# Patient Record
Sex: FEMALE | Race: BLACK OR AFRICAN AMERICAN | URBAN - METROPOLITAN AREA
[De-identification: names, ages, dates, MRNs, and addresses within clinical notes are randomized per-mention and may not be internally consistent; named-entity substitution may affect disease eponyms.]

---

## 2019-12-05 ENCOUNTER — IMPORTED ENCOUNTER (OUTPATIENT)
Dept: URBAN - METROPOLITAN AREA CLINIC 9 | Facility: CLINIC | Age: 59
End: 2019-12-05

## 2019-12-06 NOTE — PATIENT DISCUSSION
GLAUCOMA SUSPECT, OU : INCREASED CUP/DISC RATIO. NO FAMILY HISTORY. NORMAL VF AND RNFL CHANGES DISCUSSED WITH PATIENT.

## 2020-06-26 ENCOUNTER — IMPORTED ENCOUNTER (OUTPATIENT)
Dept: URBAN - METROPOLITAN AREA CLINIC 9 | Facility: CLINIC | Age: 60
End: 2020-06-26

## 2020-07-28 ENCOUNTER — IMPORTED ENCOUNTER (OUTPATIENT)
Dept: URBAN - METROPOLITAN AREA CLINIC 9 | Facility: CLINIC | Age: 60
End: 2020-07-28

## 2020-09-03 ENCOUNTER — IMPORTED ENCOUNTER (OUTPATIENT)
Dept: URBAN - METROPOLITAN AREA CLINIC 9 | Facility: CLINIC | Age: 60
End: 2020-09-03

## 2020-09-09 ENCOUNTER — IMPORTED ENCOUNTER (OUTPATIENT)
Dept: URBAN - METROPOLITAN AREA CLINIC 9 | Facility: CLINIC | Age: 60
End: 2020-09-09

## 2020-12-31 ENCOUNTER — IMPORTED ENCOUNTER (OUTPATIENT)
Dept: URBAN - METROPOLITAN AREA CLINIC 9 | Facility: CLINIC | Age: 60
End: 2020-12-31

## 2021-01-13 ENCOUNTER — IMPORTED ENCOUNTER (OUTPATIENT)
Dept: URBAN - METROPOLITAN AREA CLINIC 9 | Facility: CLINIC | Age: 61
End: 2021-01-13

## 2021-03-03 ENCOUNTER — IMPORTED ENCOUNTER (OUTPATIENT)
Dept: URBAN - METROPOLITAN AREA CLINIC 9 | Facility: CLINIC | Age: 61
End: 2021-03-03

## 2021-04-14 ENCOUNTER — IMPORTED ENCOUNTER (OUTPATIENT)
Dept: URBAN - METROPOLITAN AREA CLINIC 9 | Facility: CLINIC | Age: 61
End: 2021-04-14

## 2021-05-26 ENCOUNTER — IMPORTED ENCOUNTER (OUTPATIENT)
Dept: URBAN - METROPOLITAN AREA CLINIC 9 | Facility: CLINIC | Age: 61
End: 2021-05-26

## 2021-06-16 ENCOUNTER — IMPORTED ENCOUNTER (OUTPATIENT)
Dept: URBAN - METROPOLITAN AREA CLINIC 9 | Facility: CLINIC | Age: 61
End: 2021-06-16

## 2021-06-21 ENCOUNTER — IMPORTED ENCOUNTER (OUTPATIENT)
Dept: URBAN - METROPOLITAN AREA CLINIC 9 | Facility: CLINIC | Age: 61
End: 2021-06-21

## 2021-07-02 ENCOUNTER — IMPORTED ENCOUNTER (OUTPATIENT)
Dept: URBAN - METROPOLITAN AREA CLINIC 9 | Facility: CLINIC | Age: 61
End: 2021-07-02

## 2021-07-08 ENCOUNTER — IMPORTED ENCOUNTER (OUTPATIENT)
Dept: URBAN - METROPOLITAN AREA CLINIC 9 | Facility: CLINIC | Age: 61
End: 2021-07-08

## 2021-10-16 ASSESSMENT — TONOMETRY
OD_IOP_MMHG: 10
OS_IOP_MMHG: 14
OD_IOP_MMHG: 16
OS_IOP_MMHG: 11
OD_IOP_MMHG: 11
OD_IOP_MMHG: 12
OS_IOP_MMHG: 15
OS_IOP_MMHG: 14
OD_IOP_MMHG: 15
OS_IOP_MMHG: 6
OD_IOP_MMHG: 18
OD_IOP_MMHG: 12
OD_IOP_MMHG: 18
OD_IOP_MMHG: 10
OS_IOP_MMHG: 11
OS_IOP_MMHG: 16
OS_IOP_MMHG: 11
OS_IOP_MMHG: 15
OD_IOP_MMHG: 12
OD_IOP_MMHG: 14
OS_IOP_MMHG: 14
OS_IOP_MMHG: 14
OD_IOP_MMHG: 11
OS_IOP_MMHG: 11
OS_IOP_MMHG: 8

## 2021-10-16 ASSESSMENT — KERATOMETRY
OS_K1POWER_DIOPTERS: 43
OS_AXISANGLE2_DEGREES: 112
OD_AXISANGLE2_DEGREES: 19
OS_K1POWER_DIOPTERS: 42.75
OS_AXISANGLE_DEGREES: 22
OS_AXISANGLE_DEGREES: 17
OD_K1POWER_DIOPTERS: 43
OD_AXISANGLE_DEGREES: 101
OD_K2POWER_DIOPTERS: 43.5
OD_AXISANGLE2_DEGREES: 11
OS_K2POWER_DIOPTERS: 43.75
OD_K2POWER_DIOPTERS: 43.25
OD_AXISANGLE_DEGREES: 109
OS_K2POWER_DIOPTERS: 44
OD_K1POWER_DIOPTERS: 43
OS_AXISANGLE2_DEGREES: 107

## 2021-10-16 ASSESSMENT — VISUAL ACUITY
OD_CC: 20/20 -2 SN
OD_CC: 20/30 SN
OS_SC: 20/400 SN
OS_CC: 20/25 SN
OS_CC: 20/30 + SN
OD_CC: 20/30 + SN
OS_CC: CF 2FT SN
OS_CC: 20/30 -2 SN
OD_CC: 20/30 + SN
OD_CC: 20/25 - SN
OS_CC: CF 6' LV
OD_CC: 20/20 -2 SN
OS_CC: 20/25 SN
OD_CC: 20/30 SN
OS_CC: 20/30 - SN
OD_CC: 20/30 +2 SN
OS_SC: 20/200 SN
OD_CC: 20/30 SN
OD_CC: 20/20 SN
OS_SC: CF 1FT SN
OS_CC: 20/40 SN
OD_CC: 20/30 -2 SN
OD_SC: 20/200 SN
OD_CC: 20/25 -2 SN
OD_CC: 20/30 + SN
OS_CC: CF 2FT SN
OS_CC: 20/25 -2 SN
OD_CC: 20/30 - SN
OD_CC: 20/40 SN
OS_CC: 20/400 SN
OS_CC: 20/20 SN
OD_CC: 20/25 -2 SN
OS_CC: 20/40 - SN
OS_CC: CF 4' LV
OD_CC: 20/30 SN

## 2022-07-01 RX ORDER — HYDROCHLOROTHIAZIDE 25 MG/1
TABLET ORAL
COMMUNITY
End: 2022-09-06 | Stop reason: SDUPTHER

## 2022-07-01 RX ORDER — HYDROXYCHLOROQUINE SULFATE 200 MG/1
TABLET, FILM COATED ORAL
COMMUNITY

## 2022-07-01 RX ORDER — FOLIC ACID 1 MG/1
TABLET ORAL
COMMUNITY

## 2022-07-01 RX ORDER — CARVEDILOL 12.5 MG/1
TABLET ORAL
COMMUNITY

## 2022-07-01 RX ORDER — FUROSEMIDE 40 MG/1
TABLET ORAL
COMMUNITY

## 2022-07-01 RX ORDER — FLECAINIDE ACETATE 50 MG/1
TABLET ORAL
COMMUNITY

## 2022-07-01 RX ORDER — LEFLUNOMIDE 20 MG/1
TABLET ORAL
COMMUNITY

## 2022-08-14 PROBLEM — S83.242A TEAR OF MEDIAL MENISCUS OF LEFT KNEE: Status: ACTIVE | Noted: 2022-08-14

## 2022-08-14 PROBLEM — R26.9 GAIT DISTURBANCE: Status: ACTIVE | Noted: 2022-08-14

## 2022-08-14 PROBLEM — I47.1 SUPRAVENTRICULAR TACHYCARDIA (HCC): Status: ACTIVE | Noted: 2022-08-14

## 2022-08-14 PROBLEM — I10 BENIGN ESSENTIAL HYPERTENSION: Status: ACTIVE | Noted: 2022-08-14

## 2022-08-14 PROBLEM — M25.562 LEFT KNEE PAIN: Status: ACTIVE | Noted: 2022-08-14

## 2022-08-14 PROBLEM — M54.30 SCIATICA: Status: ACTIVE | Noted: 2022-08-14

## 2022-08-14 PROBLEM — D72.829 LEUKOCYTOSIS: Status: ACTIVE | Noted: 2022-08-14

## 2022-08-14 PROBLEM — G62.9 POLYNEUROPATHY: Status: ACTIVE | Noted: 2022-08-14

## 2022-08-14 PROBLEM — N95.1 FLUSHING, MENOPAUSAL: Status: ACTIVE | Noted: 2022-08-14

## 2022-08-14 PROBLEM — M17.12 OSTEOARTHRITIS OF LEFT KNEE: Status: ACTIVE | Noted: 2022-08-14

## 2022-10-13 PROBLEM — L60.8 CHANGE IN NAIL APPEARANCE: Status: ACTIVE | Noted: 2022-10-13

## 2022-10-13 PROBLEM — Z23 COVID-19 VACCINE ADMINISTERED: Status: ACTIVE | Noted: 2022-10-13

## 2022-10-13 PROBLEM — B35.1 TOENAIL FUNGUS: Status: ACTIVE | Noted: 2022-10-13

## 2022-10-13 PROBLEM — Z12.31 BREAST CANCER SCREENING BY MAMMOGRAM: Status: ACTIVE | Noted: 2022-10-13

## 2022-10-13 PROBLEM — D64.9 ANEMIA: Status: ACTIVE | Noted: 2022-10-13

## 2022-10-13 PROBLEM — M21.611 BUNION OF GREAT TOE OF RIGHT FOOT: Status: ACTIVE | Noted: 2022-10-13

## 2022-12-14 ENCOUNTER — ESTABLISHED PATIENT (OUTPATIENT)
Dept: URBAN - METROPOLITAN AREA CLINIC 14 | Facility: CLINIC | Age: 62
End: 2022-12-14

## 2022-12-14 PROCEDURE — 92082 INTERMEDIATE VISUAL FIELD XM: CPT

## 2022-12-14 PROCEDURE — 99214 OFFICE O/P EST MOD 30 MIN: CPT

## 2022-12-14 PROCEDURE — 92285 EXTERNAL OCULAR PHOTOGRAPHY: CPT

## 2022-12-14 ASSESSMENT — VISUAL ACUITY
OD_CC: 20/30+1
OS_CC: CF 2FT

## 2024-10-18 ENCOUNTER — COMPREHENSIVE EXAM (OUTPATIENT)
Facility: LOCATION | Age: 64
End: 2024-10-18

## 2024-10-18 DIAGNOSIS — H43.811: ICD-10-CM

## 2024-10-18 DIAGNOSIS — Z79.899: ICD-10-CM

## 2024-10-18 DIAGNOSIS — H25.11: ICD-10-CM

## 2024-10-18 DIAGNOSIS — H02.422: ICD-10-CM

## 2024-10-18 DIAGNOSIS — Z98.890: ICD-10-CM

## 2024-10-18 DIAGNOSIS — H11.153: ICD-10-CM

## 2024-10-18 DIAGNOSIS — H02.834: ICD-10-CM

## 2024-10-18 DIAGNOSIS — H04.123: ICD-10-CM

## 2024-10-18 DIAGNOSIS — H02.831: ICD-10-CM

## 2024-10-18 DIAGNOSIS — M32.10: ICD-10-CM

## 2024-10-18 DIAGNOSIS — H52.223: ICD-10-CM

## 2024-10-18 PROCEDURE — 92015 DETERMINE REFRACTIVE STATE: CPT

## 2024-10-18 PROCEDURE — 92134 CPTRZ OPH DX IMG PST SGM RTA: CPT

## 2024-10-18 PROCEDURE — 99214 OFFICE O/P EST MOD 30 MIN: CPT

## 2024-11-07 ENCOUNTER — CONSULTATION/EVALUATION (OUTPATIENT)
Facility: LOCATION | Age: 64
End: 2024-11-07

## 2024-11-07 DIAGNOSIS — H02.834: ICD-10-CM

## 2024-11-07 DIAGNOSIS — H02.831: ICD-10-CM

## 2024-11-07 PROCEDURE — 92081 LIMITED VISUAL FIELD XM: CPT

## 2024-11-07 PROCEDURE — 92285 EXTERNAL OCULAR PHOTOGRAPHY: CPT

## 2024-11-07 PROCEDURE — 92012 INTRM OPH EXAM EST PATIENT: CPT

## 2024-11-12 ENCOUNTER — COMPREHENSIVE EXAM (OUTPATIENT)
Dept: URBAN - METROPOLITAN AREA CLINIC 18 | Facility: CLINIC | Age: 64
End: 2024-11-12

## 2024-11-12 DIAGNOSIS — H35.371: ICD-10-CM

## 2024-11-12 DIAGNOSIS — H43.821: ICD-10-CM

## 2024-11-12 DIAGNOSIS — M32.10: ICD-10-CM

## 2024-11-12 DIAGNOSIS — Z98.890: ICD-10-CM

## 2024-11-12 DIAGNOSIS — Z79.899: ICD-10-CM

## 2024-11-12 PROCEDURE — 92014 COMPRE OPH EXAM EST PT 1/>: CPT

## 2024-11-12 PROCEDURE — 92201 OPSCPY EXTND RTA DRAW UNI/BI: CPT

## 2024-11-12 PROCEDURE — 92134 CPTRZ OPH DX IMG PST SGM RTA: CPT
